# Patient Record
Sex: MALE | URBAN - METROPOLITAN AREA
[De-identification: names, ages, dates, MRNs, and addresses within clinical notes are randomized per-mention and may not be internally consistent; named-entity substitution may affect disease eponyms.]

---

## 2017-11-08 ENCOUNTER — APPOINTMENT (OUTPATIENT)
Age: 47
Setting detail: DERMATOLOGY
End: 2017-11-27

## 2017-11-08 DIAGNOSIS — Z41.9 ENCOUNTER FOR PROCEDURE FOR PURPOSES OTHER THAN REMEDYING HEALTH STATE, UNSPECIFIED: ICD-10-CM

## 2017-11-08 PROCEDURE — OTHER COSMETIC QUOTE: OTHER

## 2017-11-08 NOTE — PROCEDURE: COSMETIC QUOTE
Breast Procedure 1 Percentage Discount: 0
Body Procedure 3 Price/Unit (In Dollars- Use Only Numbers And Decimals): 0.00
Anesthesia Fee Units (Optional): 1
Include Sales Tax On Implants: Yes
Include Sales Tax On Facility Fees: No
Detail Level: Zone

## 2017-12-07 ENCOUNTER — APPOINTMENT (OUTPATIENT)
Age: 47
Setting detail: DERMATOLOGY
End: 2017-12-07

## 2017-12-07 DIAGNOSIS — L64.8 OTHER ANDROGENIC ALOPECIA: ICD-10-CM

## 2017-12-07 PROCEDURE — OTHER COUNSELING: OTHER

## 2017-12-07 PROCEDURE — OTHER FOLLOW UP ORDERS: OTHER

## 2017-12-07 PROCEDURE — 99213 OFFICE O/P EST LOW 20 MIN: CPT

## 2017-12-07 NOTE — PROCEDURE: COUNSELING
Patient Specific Counseling (Will Not Stick From Patient To Patient): Patient had hair plug procedure completed six years ago. Patient states that he did not follow the post care instructions as instructed, which included an oral medication. Patient states that the plugs were completed by hand by a physician in a [place]. Discussed with patient the success rate of PRP including supplements needed to be taken after procedure. Discussed vitamins that are injected into the follicle during PRP procedure. Discussed hair and nail vitamins with patient. Recommended patient have plugs again and follow up with PRP injections. Discussed with patient the use of propecia. Discussed with patient having PRP completed and seeing us six post procedure. Patient Specific Counseling (Will Not Stick From Patient To Patient): Patient had hair plug procedure completed six years ago. Patient states that he did not follow the post care instructions as instructed, which included an oral medication. Patient states that the plugs were completed by hand by a physician in a [place]. Discussed with patient the success rate of PRP including supplements needed to be taken after procedure. Discussed vitamins that are injected into the follicle during PRP procedure. Discussed hair and nail vitamins with patient. Recommended patient have plugs again and follow up with PRP injections. Discussed with patient the use of propecia. Discussed with patient having PRP completed and seeing us six post procedure.

## 2017-12-07 NOTE — PROCEDURE: FOLLOW UP ORDERS
Follow-Up Preamble: The following orders were made during the visit: PRP injections for scalp
Detail Level: Zone

## 2017-12-07 NOTE — HPI: OTHER
Condition:: Labs reviewed
Please Describe Your Condition:: Patient comes in for the complaint of hair loss. Labs completed on 11/9/17.

## 2022-09-17 NOTE — HPI: HAIR LOSS
How Did The Hair Loss Occur?: gradual in onset
What Hair Products Do You Use?: Dove shampoo.
Additional History: Had hair plugs done about 5 years ago. Was happy with it.
149.86